# Patient Record
Sex: MALE | URBAN - METROPOLITAN AREA
[De-identification: names, ages, dates, MRNs, and addresses within clinical notes are randomized per-mention and may not be internally consistent; named-entity substitution may affect disease eponyms.]

---

## 2018-12-15 ENCOUNTER — APPOINTMENT (OUTPATIENT)
Dept: GENERAL RADIOLOGY | Facility: CLINIC | Age: 53
End: 2018-12-15
Attending: EMERGENCY MEDICINE
Payer: OTHER MISCELLANEOUS

## 2018-12-15 ENCOUNTER — HOSPITAL ENCOUNTER (EMERGENCY)
Facility: CLINIC | Age: 53
Discharge: HOME | End: 2018-12-15
Attending: EMERGENCY MEDICINE | Admitting: EMERGENCY MEDICINE
Payer: OTHER MISCELLANEOUS

## 2018-12-15 VITALS
RESPIRATION RATE: 16 BRPM | HEART RATE: 90 BPM | DIASTOLIC BLOOD PRESSURE: 77 MMHG | OXYGEN SATURATION: 97 % | TEMPERATURE: 98.1 F | SYSTOLIC BLOOD PRESSURE: 145 MMHG

## 2018-12-15 DIAGNOSIS — S46.912A STRAIN OF LEFT SHOULDER, INITIAL ENCOUNTER: ICD-10-CM

## 2018-12-15 PROCEDURE — 99284 EMERGENCY DEPT VISIT MOD MDM: CPT

## 2018-12-15 PROCEDURE — 73030 X-RAY EXAM OF SHOULDER: CPT | Mod: LT

## 2018-12-15 PROCEDURE — 25000132 ZZH RX MED GY IP 250 OP 250 PS 637: Performed by: EMERGENCY MEDICINE

## 2018-12-15 RX ORDER — IBUPROFEN 600 MG/1
600 TABLET, FILM COATED ORAL ONCE
Status: COMPLETED | OUTPATIENT
Start: 2018-12-15 | End: 2018-12-15

## 2018-12-15 RX ORDER — IBUPROFEN 200 MG
600 TABLET ORAL EVERY 4 HOURS PRN
Qty: 60 TABLET | Refills: 0 | Status: SHIPPED | OUTPATIENT
Start: 2018-12-15 | End: 2019-05-28

## 2018-12-15 RX ORDER — ACETAMINOPHEN 325 MG/1
975 TABLET ORAL ONCE
Status: COMPLETED | OUTPATIENT
Start: 2018-12-15 | End: 2018-12-15

## 2018-12-15 RX ADMIN — ACETAMINOPHEN 975 MG: 325 TABLET, FILM COATED ORAL at 12:17

## 2018-12-15 RX ADMIN — IBUPROFEN 600 MG: 600 TABLET, FILM COATED ORAL at 12:17

## 2018-12-15 ASSESSMENT — ENCOUNTER SYMPTOMS
ARTHRALGIAS: 1
WEAKNESS: 0
NUMBNESS: 0

## 2018-12-15 NOTE — DISCHARGE INSTRUCTIONS

## 2018-12-15 NOTE — ED PROVIDER NOTES
History     Chief Complaint:  Shoulder Injury    HPI   An iPad  was used obtain the below history as well as to explain diagnosis, plan of treatment, reasons to return to the emergency department and appropriate follow up.    Gregg Holder is a 53 year old, right-handed male who presents to the emergency department for evaluation after a fall at work. He reports he fell onto his left shoulder while at work this morning at 0940. [place]. Currently, he has pain in the front and back of his shoulder. The pain does not radiate down his arm and he has no numbness or weakness in his hand. Of note, he did take Aspirin at work when the injury occurred which did seem to relieve his symptoms.     Allergies:  NKDA    Medications:    The patient is currently on no regular medications.    Past Medical History:    The patient denies any significant past medical history.    Past Surgical History:    The patient does not have any pertinent past surgical history.    Family History:    No past pertinent family history.    Social History:  Injury happened at work.      Review of Systems   Musculoskeletal: Positive for arthralgias (left shoulder).   Neurological: Negative for weakness and numbness.   All other systems reviewed and are negative.        Physical Exam     Patient Vitals for the past 24 hrs:   BP Temp Temp src Pulse Resp SpO2   12/15/18 1145 145/77 98.1  F (36.7  C) Oral 90 16 97 %     Physical Exam  General: Alert, interactive in mild distress  Head:  Scalp is atraumatic  Eyes:  The pupils are equal, round, and reactive to light    EOM's intact    No scleral icterus  ENT:      Nose:  The external nose is normal  Ears:  External ears are normal  Mouth/Throat: The oropharynx is normal    Mucus membranes are moist      Neck:  Normal range of motion.      There is no rigidity.    Trachea is in the midline         CV:  Regular rate and rhythm    No murmur, 2+ radial pulse on the  left.  Resp:  Breath sounds are clear bilaterally    Non-labored, no retractions or accessory muscle use     MS:  Normal strength in all 4 extremities, No midline cervical, thoracic, or lumbar tenderness, full ROM left shoulder, tenderness in left suprascapular area and overlying the deltoid.  Skin:  Warm and dry, No rash or lesions noted.  Neuro: Strength 5/5 x4.  Sensation intact  In all 4 extremities.      GCS: 15  Psych:  Awake. Alert.  Normal affect.      Appropriate interactions.    Emergency Department Course   Imaging:  Radiographic findings were communicated with the patient who voiced understanding of the findings.    XR Shoulder 3 views, Left:   Unremarkable exam, as per radiology.     Interventions:  1217 Ibuprofen, 600 mg, PO   Tylenol, 975 mg, PO    Emergency Department Course:  Nursing notes and vitals reviewed. (1146) I performed an exam of the patient as documented above.      Medicine administered as documented above.      The patient was sent for a shoulder x-ray while in the emergency department, findings above.      (1302) I rechecked the patient and discussed the results of his workup thus far.     He was given a referral to see [facility] as an outpatient and his arm was placed in a sling for comfort.      Findings and plan explained to the Patient. Patient discharged home with instructions regarding supportive care, medications, and reasons to return. The importance of close follow-up was reviewed. The patient was prescribed ibuprofen.      I personally reviewed the imaging results with the Patient and answered all related questions prior to discharge.        Impression & Plan      Medical Decision Making:  Gregg Holder is a 53 year old male who was seen and evaluated. I believe he has likely suffered a strain to the left shoulder. Potential rotator cuff injury remains in the differential. There are no signs of fracture, dislocation, DVT, or arterial injury. The patient was provided the number  for [facility] and was placed in a sling. I have recommended Tylenol and ibuprofen for pain management. Follow up with [facility] as noted above. He will return to the ED if new symptoms develop.     Critical Care time:  none    Diagnosis:    ICD-10-CM    1. Strain of left shoulder, initial encounter S46.912A        Disposition:  discharged to home    Discharge Medications:     Medication List      Started    ibuprofen 200 MG tablet  Commonly known as:  ADVIL/MOTRIN  600 mg, Oral, EVERY 4 HOURS PRN          Scribe Disclosure:  I, Xiomara Marrufo, am serving as a scribe on 12/15/2018 at 11:46 AM to personally document services performed by Michael Romo MD based on my observations and the provider's statements to me.     Xiomara Marrufo  12/15/2018    EMERGENCY DEPARTMENT       Michael Romo MD  12/15/18 7595

## 2018-12-15 NOTE — LETTER
December 15, 2018      To Whom It May Concern:      Gregg Holder was seen in our Emergency Department today, 12/15/18.  I expect his condition to improve over the next 2 days.  He may return to work/school when improved.    Sincerely,        Michael Romo MD

## 2018-12-15 NOTE — ED AVS SNAPSHOT
Emergency Department  [address] [place] [address]  Phone:  [phone]  Fax:  [phone]                                    Gregg Holder   MRN: [id number]    Department:   Emergency Department   Date of Visit:  12/15/2018           After Visit Summary Signature Page    I have received my discharge instructions, and my questions have been answered. I have discussed any challenges I see with this plan with the nurse or doctor.    ..........................................................................................................................................  Patient/Patient Representative Signature      ..........................................................................................................................................  Patient Representative Print Name and Relationship to Patient    ..................................................               ................................................  Date                                   Time    ..........................................................................................................................................  Reviewed by Signature/Title    ...................................................              ..............................................  Date                                               Time          22EPIC Rev 08/18

## 2019-01-31 ENCOUNTER — OFFICE VISIT (OUTPATIENT)
Dept: FAMILY MEDICINE | Facility: CLINIC | Age: 54
End: 2019-01-31
Payer: COMMERCIAL

## 2019-01-31 VITALS
SYSTOLIC BLOOD PRESSURE: 152 MMHG | HEART RATE: 60 BPM | TEMPERATURE: 97.2 F | RESPIRATION RATE: 16 BRPM | OXYGEN SATURATION: 100 % | BODY MASS INDEX: 24.68 KG/M2 | HEIGHT: 70 IN | WEIGHT: 172.4 LBS | DIASTOLIC BLOOD PRESSURE: 85 MMHG

## 2019-01-31 DIAGNOSIS — R03.0 ELEVATED BLOOD PRESSURE READING WITHOUT DIAGNOSIS OF HYPERTENSION: ICD-10-CM

## 2019-01-31 DIAGNOSIS — Z00.00 HEALTHCARE MAINTENANCE: ICD-10-CM

## 2019-01-31 DIAGNOSIS — M75.122 COMPLETE TEAR OF LEFT ROTATOR CUFF: Primary | ICD-10-CM

## 2019-01-31 RX ORDER — IBUPROFEN 600 MG/1
600 TABLET, FILM COATED ORAL EVERY 6 HOURS PRN
Qty: 30 TABLET | Refills: 3 | Status: SHIPPED | OUTPATIENT
Start: 2019-01-31 | End: 2020-01-31

## 2019-01-31 RX ORDER — IBUPROFEN 600 MG/1
600 TABLET, FILM COATED ORAL EVERY 6 HOURS PRN
Qty: 30 TABLET | Refills: 3 | Status: SHIPPED | OUTPATIENT
Start: 2019-01-31 | End: 2019-05-28

## 2019-01-31 SDOH — HEALTH STABILITY: MENTAL HEALTH: HOW OFTEN DO YOU HAVE A DRINK CONTAINING ALCOHOL?: NEVER

## 2019-01-31 ASSESSMENT — MIFFLIN-ST. JEOR: SCORE: 1633.25

## 2019-01-31 NOTE — PATIENT INSTRUCTIONS
Here is the plan from today's visit    1. Complete tear of left rotator cuff  Try physical therapy for two weeks. I would like to see you back after two weeks.   - [facility] PT, HAND AND CHIROPRACTIC REFERRAL - [facility]; Future  - ibuprofen (ADVIL/MOTRIN) 600 MG tablet; Take 1 tablet (600 mg) by mouth every 6 hours as needed for moderate pain  Dispense: 30 tablet; Refill: 3  - ibuprofen (ADVIL/MOTRIN) 600 MG tablet; Take 1 tablet (600 mg) by mouth every 6 hours as needed for moderate pain  Dispense: 30 tablet; Refill: 3      Please call or return to clinic if your symptoms don't go away.    Follow up plan  Please make a clinic appointment for follow up with me (GUILLERMO ROBLERO) in 2  weeks for recheck.    Thank you for coming to [facility] today.  Lab Testing:  **If you had lab testing today and your results are reassuring or normal they will be mailed to you or sent through [employer] within 7 days.   **If the lab tests need quick action we will call you with the results.  The phone number we will call with results is # [phone] (home) . If this is not the best number please call our clinic and change the number.  Medication Refills:  If you need any refills please call your pharmacy and they will contact us.   If you need to  your refill at a new pharmacy, please contact the new pharmacy directly. The new pharmacy will help you get your medications transferred faster.   Scheduling:  If you have any concerns about today's visit or wish to schedule another appointment please call our office during normal business hours [phone] (8-5:00 M-F)  If a referral was made to a [facility] and you don't get a call from central scheduling please call [phone].  If a Mammogram was ordered for you at [facility] call [phone] to schedule or change your appointment.  If you had an XRay/CT/Ultrasound/MRI ordered the number is [phone] to schedule or change your radiology  appointment.   Medical Concerns:  If you have urgent medical concerns please call [phone] at any time of the day.    Myles Ramirez MD

## 2019-01-31 NOTE — PROGRESS NOTES
"       HPI       Gregg Holder is a 54 year old  who presents for   Chief Complaint   Patient presents with     Shoulder Pain     Seen in ED 1.5 months ago after falling at work and landing on his left shoulder. Seen in the ED at that time with negative XR. Referred to ortho but did not follow up.     Now continues to have pain in the left shoulder with decreased ROM.  His pain is generalized throughout the muscles of the posterior left shoulder down into the left arm to the left elbow.  He describes having decreased range of motion particularly with overhead movements.  His pain is a 10 out of 10 at the worst and described as a throbbing.  His pain is worsened with movement, sleeping on the shoulder, and cold.  No alleviating factors.  He has had some relief with ibuprofen.    Patient reports he is otherwise a healthy individual without any past medical history.  He has seen doctors for immigration but otherwise has not been to a physician.  He does not smoke or drink alcohol.  He denies any family history of medical illness that he knows of.    A One On One  was used for  this visit.    +++++++    Problem, Medication and Allergy Lists were reviewed and updated if needed..    Patient is an established patient of this clinic..         Review of Systems:   Review of Systems  A four-point review of systems was completed and negative other than noted in the HPI.       Physical Exam:     Vitals:    01/31/19 1309 01/31/19 1311   BP: 149/85 152/85   Pulse: 60    Resp: 16    Temp: 97.2  F (36.2  C)    TempSrc: Oral    SpO2: 100%    Weight: 78.2 kg (172 lb 6.4 oz)    Height: 1.778 m (5' 10\")      Body mass index is 24.74 kg/m .  Vital signs normal except elevated blood pressure.     Physical Exam   Constitutional: He appears well-developed and well-nourished. No distress.   HENT:   Head: Normocephalic and atraumatic.   Eyes: Conjunctivae and EOM are normal.   Neck: Normal range of motion.   Cardiovascular: Normal " "rate, regular rhythm and normal heart sounds. Exam reveals no gallop and no friction rub.   No murmur heard.  Pulmonary/Chest: Effort normal and breath sounds normal. No stridor. No respiratory distress. He has no wheezes. He has no rales.   Musculoskeletal: He exhibits no edema or deformity.   Neurological: He is alert.   Skin: Skin is warm and dry. He is not diaphoretic.   Left Shoulder Exam     Tenderness   The patient is experiencing no tenderness.     Range of Motion   Active abduction: abnormal   Forward flexion: abnormal     Muscle Strength   Supraspinatus: 5/5   Subscapularis: 5/5     Tests   Impingement: negative    Comments:  Abduction of left shoulder limited with rotation of scapula and not at the AC joint               Results:   No testing ordered today    Assessment and Plan        1. Complete tear of left rotator cuff  Patient's physical exam is consistent with a complete tear of the rotator cuff on the left with rotation of the scapula with abduction and not movement at the AC joint.  This likely is a result of his injury 6 weeks ago.  We will try 2 weeks of physical therapy and see him back.  If not improving at that time we will do MRI and consider referral to Ortho.    - ibuprofen (ADVIL/MOTRIN) 600 MG tablet; Take 1 tablet (600 mg) by mouth every 6 hours as needed for moderate pain  Dispense: 30 tablet; Refill: 3  - PHYSICAL THERAPY REFERRAL - EXTERNAL; Future    2. Elevated blood pressure reading without diagnosis of hypertension  Patient's blood pressure elevated today.  He declined any further discussion of this.  I will bring this up again on his future visit if it remains elevated.    3. Healthcare maintenance  Patient is due for healthcare maintenance items such as colonoscopy.  We will discuss again at the next visit.  At this point he is adamant that \"he knows his body\" and that he does not need any further testing for colon cancer.  I did explain the reasoning for screening and why we " would do this in a symptom medic patient.     There are no discontinued medications.    Options for treatment and follow-up care were reviewed with the patient. Gregg Holder  engaged in the decision making process and verbalized understanding of the options discussed and agreed with the final plan.    Myles Ramirez MD

## 2019-01-31 NOTE — PROGRESS NOTES
Preceptor Attestation:   Patient seen and discussed with the resident. Assessment and plan reviewed with resident and agreed upon.   Supervising Physician:  Alex Casanova MD  [facility]

## 2019-02-04 ENCOUNTER — TELEPHONE (OUTPATIENT)
Dept: FAMILY MEDICINE | Facility: CLINIC | Age: 54
End: 2019-02-04

## 2019-02-04 NOTE — TELEPHONE ENCOUNTER
[facility] phone call message- general phone call:    Reason for call: Patient requesting PT referral to be faxed to [facility] @ [phone].     Return call needed: No    OK to leave a message on voice mail? Yes    Primary language: English      needed? No    Call taken on February 4, 2019 at 11:33 AM by Stephanie Andujar

## 2019-05-14 ENCOUNTER — OFFICE VISIT (OUTPATIENT)
Dept: FAMILY MEDICINE | Facility: CLINIC | Age: 54
End: 2019-05-14
Payer: COMMERCIAL

## 2019-05-14 VITALS
OXYGEN SATURATION: 98 % | WEIGHT: 172.6 LBS | HEIGHT: 72 IN | BODY MASS INDEX: 23.38 KG/M2 | HEART RATE: 68 BPM | TEMPERATURE: 97.9 F | RESPIRATION RATE: 16 BRPM | SYSTOLIC BLOOD PRESSURE: 134 MMHG | DIASTOLIC BLOOD PRESSURE: 83 MMHG

## 2019-05-14 DIAGNOSIS — S46.012D TRAUMATIC COMPLETE TEAR OF LEFT ROTATOR CUFF, SUBSEQUENT ENCOUNTER: Primary | ICD-10-CM

## 2019-05-14 ASSESSMENT — MIFFLIN-ST. JEOR: SCORE: 1659.16

## 2019-05-14 NOTE — NURSING NOTE
Due to patient being non-English speaking/uses sign language, an  was used for this visit. Only for face-to-face interpretation by an external agency, date and length of interpretation can be found on the scanned worksheet.     name: Quincy Lyon  Language: Malian  Agency: [employer]  Phone number: [phone]  Type of interpretation: Face-to-face, spoken        Bee Garcia CMA

## 2019-05-14 NOTE — PATIENT INSTRUCTIONS
Here is the plan from today's visit    1. Complete tear of left rotator cuff  - MR Shoulder Left w/o Contrast; Future  - ORTHOPEDICS ADULT REFERRAL - INTERNAL    Please call or return to clinic if your symptoms don't go away.    Follow up plan  As needed.     Thank you for coming to [facility] today.  Lab Testing:  **If you had lab testing today and your results are reassuring or normal they will be mailed to you or sent through [employer] within 7 days.   **If the lab tests need quick action we will call you with the results.  The phone number we will call with results is # [phone] (home) . If this is not the best number please call our clinic and change the number.  Medication Refills:  If you need any refills please call your pharmacy and they will contact us.   If you need to  your refill at a new pharmacy, please contact the new pharmacy directly. The new pharmacy will help you get your medications transferred faster.   Scheduling:  If you have any concerns about today's visit or wish to schedule another appointment please call our office during normal business hours [phone] (8-5:00 M-F)  If a referral was made to a [facility] and you don't get a call from central scheduling please call [phone].  If a Mammogram was ordered for you at [facility] call [phone] to schedule or change your appointment.  If you had an XRay/CT/Ultrasound/MRI ordered the number is [phone] to schedule or change your radiology appointment.   Medical Concerns:  If you have urgent medical concerns please call [phone] at any time of the day.    Return to clinic after MRI performed to discuss results.  Sarah Baron MD    Patient was seen today with Dr. Sarah Baron.      Thanks!   Laura

## 2019-05-14 NOTE — PROGRESS NOTES
"       HPI       Gregg Holder is a 54 year old  who presents for   Chief Complaint   Patient presents with     Shoulder Pain     left shoulder pain x's 4 months. Fell on his shoulder. ROM limited and pain increases when laying on his side.      Patient is a 54-year-old male who presents for further evaluation of left shoulder pain. [place]  The patient was initially seen for his left shoulder pain in January for follow-up after being seen in the emergency department.  An x-ray in the emergency department was negative.  He was suspected to have a full-thickness rotator cuff tear based off of exam.  He was referred to physical therapy with plan to return for further evaluation in 2 weeks if not improving.  He is here today because he has completed the course of physical therapy with some improvement but since completion a month ago he has noticed that his left arm is becoming weaker.  He is noticed increased pain when lying on his left side and limited range of motion overhead due to weakness more than pain.  His pain is localized to the lateral shoulder and posterior shoulder.  He denies any particular alleviating factors for the pain.  No new injury to the shoulder.  Denies other joint pain, fevers, chills, radiation of the pain, nausea, vomiting, chest pain, shortness of breath.    A [employer]  was used for  this visit.    +++++++    Problem, Medication and Allergy Lists were reviewed and updated if needed..    Patient is an established patient of this clinic..         Review of Systems:   Review of Systems  A four-point review of systems is completed and negative other than the HPI.       Physical Exam:     Vitals:    05/14/19 0814 05/14/19 0815   BP: 148/78 134/83   Pulse: 68    Resp: 16    Temp: 97.9  F (36.6  C)    TempSrc: Oral    SpO2: 98%    Weight: 78.3 kg (172 lb 9.6 oz)    Height: 1.818 m (5' 11.58\")      Body mass index is 23.69 kg/m .  Vitals were " reviewed and were normal     Physical Exam   Constitutional: He appears well-developed and well-nourished. No distress.   HENT:   Head: Normocephalic and atraumatic.   Eyes: Conjunctivae are normal.   Neck: Normal range of motion.   Neurological: He is alert.   Skin: Skin is warm and dry. He is not diaphoretic.   Psychiatric: He has a normal mood and affect.   Right Shoulder Exam   Right shoulder exam is normal.      Left Shoulder Exam     Tenderness   The patient is experiencing no tenderness.     Range of Motion   Active abduction:  140 abnormal   Passive abduction:  150 abnormal     Tests   Impingement: negative    Other   Sensation: normal                 Results:   Results are ordered and pending    Assessment and Plan        1. Complete tear of left rotator cuff  Continue to suspect suspect a full-thickness tear of the rotator cuff likely supraspinatus.  Patient will be referred for MRI and consultation with orthopedic surgery given continued weakness after completing physical therapy.  He was also given a letter for work restrictions with 20 pound weight limit and no overhead lifting.  He will return to sports medicine clinic as needed.  - MR Shoulder Left w/o Contrast; Future  - ORTHOPEDICS ADULT REFERRAL - INTERNAL       There are no discontinued medications.    Options for treatment and follow-up care were reviewed with the patient. Gregg Holder  engaged in the decision making process and verbalized understanding of the options discussed and agreed with the final plan.    Myles Ramirez MD     Patient seen, evaluated and discussed with the resident. I have verified the content of the note, which accurately reflects my assessment of the patient and the plan of care.   Supervising Physician:  Sarah Baron MD

## 2019-05-14 NOTE — LETTER
May 14, 2019      Gregg Holder  [address] [place] [id number]        To Whom It May Concern:    Gregg Holder  was seen on 5/14/19.  He will not be able to lift greater than 20 lbs or any overhead lifting until further notice due to a left shoulder injury.       Sincerely,        Sarah Baron MD

## 2019-05-20 ENCOUNTER — ANCILLARY PROCEDURE (OUTPATIENT)
Dept: MRI IMAGING | Facility: CLINIC | Age: 54
End: 2019-05-20
Attending: FAMILY MEDICINE
Payer: COMMERCIAL

## 2019-05-20 DIAGNOSIS — M75.122 COMPLETE TEAR OF LEFT ROTATOR CUFF: ICD-10-CM

## 2019-05-28 ENCOUNTER — OFFICE VISIT (OUTPATIENT)
Dept: FAMILY MEDICINE | Facility: CLINIC | Age: 54
End: 2019-05-28
Payer: COMMERCIAL

## 2019-05-28 VITALS
HEIGHT: 72 IN | RESPIRATION RATE: 16 BRPM | OXYGEN SATURATION: 97 % | TEMPERATURE: 98 F | HEART RATE: 62 BPM | SYSTOLIC BLOOD PRESSURE: 118 MMHG | BODY MASS INDEX: 22.89 KG/M2 | DIASTOLIC BLOOD PRESSURE: 74 MMHG | WEIGHT: 169 LBS

## 2019-05-28 DIAGNOSIS — M75.102 ROTATOR CUFF SYNDROME, LEFT: ICD-10-CM

## 2019-05-28 DIAGNOSIS — M75.02 ADHESIVE CAPSULITIS OF LEFT SHOULDER: Primary | ICD-10-CM

## 2019-05-28 RX ORDER — LOSARTAN POTASSIUM 100 MG/1
100 TABLET ORAL DAILY
Refills: 5 | COMMUNITY
Start: 2019-01-09

## 2019-05-28 RX ORDER — METOPROLOL SUCCINATE 100 MG/1
100 TABLET, EXTENDED RELEASE ORAL DAILY
Refills: 2 | COMMUNITY
Start: 2019-04-17

## 2019-05-28 RX ORDER — ASPIRIN 81 MG
81 TABLET, DELAYED RELEASE (ENTERIC COATED) ORAL DAILY
Refills: 3 | COMMUNITY
Start: 2019-05-16

## 2019-05-28 RX ORDER — TRIAMCINOLONE ACETONIDE 40 MG/ML
40 INJECTION, SUSPENSION INTRA-ARTICULAR; INTRAMUSCULAR ONCE
Status: COMPLETED | OUTPATIENT
Start: 2019-05-28 | End: 2019-05-28

## 2019-05-28 RX ORDER — GABAPENTIN 100 MG/1
300 CAPSULE ORAL AT BEDTIME
Refills: 2 | COMMUNITY
Start: 2019-05-07

## 2019-05-28 RX ORDER — CHLORTHALIDONE 50 MG/1
50 TABLET ORAL DAILY
Refills: 5 | COMMUNITY
Start: 2019-04-15

## 2019-05-28 RX ORDER — ATORVASTATIN CALCIUM 40 MG/1
40 TABLET, FILM COATED ORAL DAILY
Refills: 11 | COMMUNITY
Start: 2019-05-16

## 2019-05-28 RX ADMIN — TRIAMCINOLONE ACETONIDE 40 MG: 40 INJECTION, SUSPENSION INTRA-ARTICULAR; INTRAMUSCULAR at 08:46

## 2019-05-28 ASSESSMENT — MIFFLIN-ST. JEOR: SCORE: 1642.83

## 2019-05-28 NOTE — PROGRESS NOTES
"SUBJECTIVE: Gregg Holder is a RHD 54 year old male who is here to discuss results of his Left shoulder MRI.  Some difficulties using it fully.  [place], [place].  Stopped pushing heavier carts.  Posterior pain in left shoulder, posterior prevents him from moving the arm.  Painful up overhead and external rotation are the worst.  Pt worried he has an infection in left shoulder.  Cant sleep at night.  In pain daily, concerned he's lost weight. No fevers.    Pt reports he has never taken BP meds.  Three meds are listed for BP.  Pt reports he's never taken a pill for this ever.  Pt reports taking pain pill.  Also not taking Zantac.  Not taking cholesterol meds.    PAST MEDICAL, SOCIAL, SURGICAL AND FAMILY HISTORY: He  has no past medical history on file.  He  has no past surgical history on file.  His family history is not on file.  He reports that he has never smoked. He has never used smokeless tobacco. He reports that he does not drink alcohol or use drugs.      ALLERGIES: He has No Known Allergies.    CURRENT MEDICATIONS: He has a current medication list which includes the following prescription(s): aspirin low dose, atorvastatin, chlorthalidone, gabapentin, ibuprofen, losartan, metoprolol succinate er, pain & fever extra strength, and ranitidine.     REVIEW OF SYSTEMS: 10 point review of systems is negative except as noted above.    EXAM:  /74   Pulse 62   Temp 98  F (36.7  C) (Oral)   Resp 16   Ht 1.818 m (5' 11.58\")   Wt 76.7 kg (169 lb)   SpO2 97%   BMI 23.19 kg/m    CONSTITUTIONIAL: healthy, alert, no distress and cooperative  HEAD: Normocephalic. No masses, lesions, tenderness or abnormalities  ENT: ENT exam normal, no neck nodes or sinus tenderness  SKIN: no suspicious lesions or rashes  GAIT: normal  Stance: normal  NEUROLOGIC: Normal muscle tone and strength, reflexes normal, sensation grossly normal.  PSYCHIATRIC: affect normal/bright and mentation appears " normal.    MUSCULOSKELETAL: left shoulder pain    Palpation:  Non-tender SC joint, clavicle, AC joint, acromion, subacromial space, proximal bicep tendon and upper trapezius muscle   Tender: supraspinatus, subscapularis  Range of Motion        Active:abduction:90, flexion 150        Passive: abduction: 150 flexion: 180, external rotation- only back pocket  Strength: rotator cuff strength intact, but weaker subscapularis  Special tests: positive Neer's test, Negative Kim, Negative Sulcus Sign, positive Alvarenga's        ASSESSMENT/PLAN:  Pt is a 53 yo Nigerien male with PMHx of BP not taking meds presenting with left shoulder pain  1. Left shoulder pain- RC dysfunction, elements of frozen shoulder  MRI reviewed  CSI injection today  20 lbs lifting restrictions  Pt to work with PT for strengthening, wants to go to [facility]  Letter given with work restrictions    RTC 6 weeks    X-RAY INTERPRETATION:   MRI left shoulder  IMPRESSION:  1. Significant motion artifact limits evaluation of the left shoulder.  2. Moderate degenerative changes at the left shoulder  acromioclavicular joint.  3. Tendinosis of the left shoulder supraspinatus, infraspinatus, and  subscapularis tendons, without full-thickness tear or tendon  retraction.  4. Normal muscle bulk of the left shoulder rotator cuff musculature.  5. Mild tendinosis of the intra-articular biceps tendon.  6. Tearing of the superior labrum as it extends posteriorly.    PROCEDURE:  JOINT ASPIRATION/INJECTION    After a discussion of risks, benefits and side effects of procedure, informed patient consent was obtained.  The left shoulder was prepped and draped in the usual clean fashion (sterile not required for this procedure).   Procedure was completed without US guidance.    ASPIRATION: Not performed.    INJECTION:  Using 9 cc of 1 % lidocaine mixed with 40 mg of kenalog, the left shoulder was successfully injected without complication.  Patient did experience some  pain relief following injection.    Invasive Procedure Safety Checklist completed by nurse? N/A

## 2019-05-28 NOTE — NURSING NOTE
Due to patient being non-English speaking/uses sign language, an  was used for this visit. Only for face-to-face interpretation by an external agency, date and length of interpretation can be found on the scanned worksheet.     name: Quincy Lyon  Language: Danish  Agency: [employer]  Phone number: [phone]  Type of interpretation: Face-to-face, spoken        Bee Garcia CMA

## 2019-05-28 NOTE — LETTER
[facility]  [address], [address]  [place]   [address]  [phone]        Gregg Holder, male, 1965  [address] [facility] [address]    Employer: [employer]    Date of Treatment: 5/28/2019    Date of Accident: 12/2018    History: Pt is a 55 yo [place] male who fell at work injuring left shoulder                      Exam:  GENERAL APPEARANCE: healthy, alert and mild distress  Palpation:  Non-tender SC joint, clavicle, AC joint, acromion, subacromial space, proximal bicep tendon and upper trapezius muscle   Tender: supraspinatus, subscapularis  Range of Motion        Active:abduction:90, flexion 150        Passive: abduction: 150 flexion: 180, external rotation- only back pocket  Strength: rotator cuff strength intact, but weaker subscapularis  Special tests: positive Neer's test, Negative Kim, Negative Sulcus Sign, positive Alvarenga's  Diagnosis:   1. Adhesive capsulitis of left shoulder    2. Rotator cuff syndrome, left        Work Related:  yes    Lab Results:   none    The employee is ABLE to return to work today.    When the patient returns to work, the following restrictions apply until 6 weeks:  A) Bend: Frequently (4-8 hours)  B) Squat: Frequently (4-8 hours)  C) Walk/Stand: Frequently (4-8 hours)  D) Reach Above Shoulders: Occasionally (1-3 hours)  E) Lift, carry, push, and pull no more than:  11-20 lbs.      Other restrictions: None    FOLLOW-UP  Follow up with Physical therapy, F/U at [facility] in 6 weeks.    Sarah Baron MD, CAQ SM